# Patient Record
Sex: FEMALE | Race: WHITE | NOT HISPANIC OR LATINO | Employment: FULL TIME
[De-identification: names, ages, dates, MRNs, and addresses within clinical notes are randomized per-mention and may not be internally consistent; named-entity substitution may affect disease eponyms.]

---

## 2017-10-22 ENCOUNTER — HEALTH MAINTENANCE LETTER (OUTPATIENT)
Age: 32
End: 2017-10-22

## 2018-04-04 LAB
HPV SOURCE: NORMAL
HUMAN PAPILLOMA VIRUS 16 DNA: NEGATIVE
HUMAN PAPILLOMA VIRUS 18 DNA: NEGATIVE
HUMAN PAPILLOMA VIRUS FINAL DIAGNOSIS: NORMAL
HUMAN PAPILLOMA VIRUS OTHER HR: NEGATIVE
SPECIMEN DESCRIPTION: NORMAL

## 2018-04-10 ENCOUNTER — RECORDS - HEALTHEAST (OUTPATIENT)
Dept: ADMINISTRATIVE | Facility: OTHER | Age: 33
End: 2018-04-10

## 2018-04-10 LAB
BKR LAB AP ABNORMAL BLEEDING: NO
BKR LAB AP BIRTH CONTROL/HORMONES: ABNORMAL
BKR LAB AP CERVICAL APPEARANCE: NORMAL
BKR LAB AP GYN ADEQUACY: ABNORMAL
BKR LAB AP GYN INTERPRETATION: ABNORMAL
BKR LAB AP HPV REFLEX: ABNORMAL
BKR LAB AP LMP: ABNORMAL
BKR LAB AP PATIENT STATUS: ABNORMAL
BKR LAB AP PREVIOUS ABNORMAL: ABNORMAL
BKR LAB AP PREVIOUS NORMAL: ABNORMAL
HIGH RISK?: NO
PATH REPORT.COMMENTS IMP SPEC: ABNORMAL
RESULT FLAG (HE HISTORICAL CONVERSION): ABNORMAL

## 2018-04-13 ENCOUNTER — RECORDS - HEALTHEAST (OUTPATIENT)
Dept: LAB | Facility: CLINIC | Age: 33
End: 2018-04-13

## 2018-04-13 LAB — PROGEST SERPL-MCNC: 12.1 NG/ML

## 2018-04-27 ENCOUNTER — RECORDS - HEALTHEAST (OUTPATIENT)
Dept: LAB | Facility: CLINIC | Age: 33
End: 2018-04-27

## 2018-04-27 LAB
FSH SERPL-ACNC: 6.6 MIU/ML
LH SERPL-ACNC: 5.2 MIU/ML
PROLACTIN SERPL-MCNC: 6.3 NG/ML (ref 0–20)
TSH SERPL DL<=0.005 MIU/L-ACNC: 1.16 UIU/ML (ref 0.3–5)

## 2018-05-10 ENCOUNTER — RECORDS - HEALTHEAST (OUTPATIENT)
Dept: ADMINISTRATIVE | Facility: OTHER | Age: 33
End: 2018-05-10

## 2018-05-10 LAB
LAB AP CHARGES (HE HISTORICAL CONVERSION): NORMAL
PATH REPORT.COMMENTS IMP SPEC: NORMAL
PATH REPORT.COMMENTS IMP SPEC: NORMAL
PATH REPORT.FINAL DX SPEC: NORMAL
PATH REPORT.GROSS SPEC: NORMAL
PATH REPORT.MICROSCOPIC SPEC OTHER STN: NORMAL
PATH REPORT.RELEVANT HX SPEC: NORMAL
RESULT FLAG (HE HISTORICAL CONVERSION): NORMAL

## 2018-08-11 DIAGNOSIS — N92.5 RETROGRADE MENSTRUATION: Primary | ICD-10-CM

## 2018-10-05 ENCOUNTER — RECORDS - HEALTHEAST (OUTPATIENT)
Dept: LAB | Facility: CLINIC | Age: 33
End: 2018-10-05

## 2018-10-05 LAB
BASOPHILS # BLD AUTO: 0 THOU/UL (ref 0–0.2)
BASOPHILS NFR BLD AUTO: 0 % (ref 0–2)
EOSINOPHIL # BLD AUTO: 0.1 THOU/UL (ref 0–0.4)
EOSINOPHIL NFR BLD AUTO: 1 % (ref 0–6)
ERYTHROCYTE [DISTWIDTH] IN BLOOD BY AUTOMATED COUNT: 12 % (ref 11–14.5)
HCT VFR BLD AUTO: 37.7 % (ref 35–47)
HGB BLD-MCNC: 12.8 G/DL (ref 12–16)
HIV 1+2 AB+HIV1 P24 AG SERPL QL IA: NEGATIVE
LYMPHOCYTES # BLD AUTO: 1.6 THOU/UL (ref 0.8–4.4)
LYMPHOCYTES NFR BLD AUTO: 22 % (ref 20–40)
MCH RBC QN AUTO: 30.3 PG (ref 27–34)
MCHC RBC AUTO-ENTMCNC: 34 G/DL (ref 32–36)
MCV RBC AUTO: 89 FL (ref 80–100)
MONOCYTES # BLD AUTO: 0.5 THOU/UL (ref 0–0.9)
MONOCYTES NFR BLD AUTO: 7 % (ref 2–10)
NEUTROPHILS # BLD AUTO: 4.9 THOU/UL (ref 2–7.7)
NEUTROPHILS NFR BLD AUTO: 69 % (ref 50–70)
PLATELET # BLD AUTO: 209 THOU/UL (ref 140–440)
PMV BLD AUTO: 11.5 FL (ref 8.5–12.5)
RBC # BLD AUTO: 4.23 MILL/UL (ref 3.8–5.4)
WBC: 7.1 THOU/UL (ref 4–11)

## 2018-10-06 LAB
ANTIBODY SCREEN: NEGATIVE
BACTERIA SPEC CULT: NO GROWTH
HBV SURFACE AG SERPL QL IA: NEGATIVE
T PALLIDUM AB SER QL: NEGATIVE

## 2018-10-08 LAB
ABO/RH(D): NORMAL
ABORH REPEAT: NORMAL
C TRACH DNA SPEC QL PROBE+SIG AMP: NEGATIVE
HCV AB SERPL QL IA: NEGATIVE
N GONORRHOEA DNA SPEC QL NAA+PROBE: NEGATIVE
RUBV IGG SERPL QL IA: POSITIVE

## 2019-02-13 ENCOUNTER — RECORDS - HEALTHEAST (OUTPATIENT)
Dept: LAB | Facility: CLINIC | Age: 34
End: 2019-02-13

## 2019-02-14 LAB — T PALLIDUM AB SER QL: NEGATIVE

## 2019-05-08 ENCOUNTER — RECORDS - HEALTHEAST (OUTPATIENT)
Dept: LAB | Facility: CLINIC | Age: 34
End: 2019-05-08

## 2019-05-09 LAB — BACTERIA SPEC CULT: NO GROWTH

## 2019-06-09 LAB
BACTERIA SPEC CULT: ABNORMAL
BACTERIA SPEC CULT: ABNORMAL

## 2019-06-18 ENCOUNTER — RECORDS - HEALTHEAST (OUTPATIENT)
Dept: ADMINISTRATIVE | Facility: OTHER | Age: 34
End: 2019-06-18

## 2019-06-18 LAB
BKR LAB AP ABNORMAL BLEEDING: NO
BKR LAB AP BIRTH CONTROL/HORMONES: NORMAL
BKR LAB AP CERVICAL APPEARANCE: NORMAL
BKR LAB AP GYN ADEQUACY: NORMAL
BKR LAB AP GYN INTERPRETATION: NORMAL
BKR LAB AP HPV REFLEX: NORMAL
BKR LAB AP LMP: NORMAL
BKR LAB AP PATIENT STATUS: NORMAL
BKR LAB AP PREVIOUS ABNORMAL: NORMAL
BKR LAB AP PREVIOUS NORMAL: NORMAL
HIGH RISK?: YES
PATH REPORT.COMMENTS IMP SPEC: NORMAL
RESULT FLAG (HE HISTORICAL CONVERSION): NORMAL

## 2019-08-24 ENCOUNTER — RECORDS - HEALTHEAST (OUTPATIENT)
Dept: LAB | Facility: CLINIC | Age: 34
End: 2019-08-24

## 2019-08-24 LAB
ALBUMIN SERPL-MCNC: 4 G/DL (ref 3.5–5)
ANION GAP SERPL CALCULATED.3IONS-SCNC: 9 MMOL/L (ref 5–18)
BUN SERPL-MCNC: 16 MG/DL (ref 8–22)
CALCIUM SERPL-MCNC: 9.6 MG/DL (ref 8.5–10.5)
CHLORIDE BLD-SCNC: 105 MMOL/L (ref 98–107)
CO2 SERPL-SCNC: 25 MMOL/L (ref 22–31)
CREAT SERPL-MCNC: 0.84 MG/DL (ref 0.6–1.1)
GFR SERPL CREATININE-BSD FRML MDRD: >60 ML/MIN/1.73M2
GLUCOSE BLD-MCNC: 96 MG/DL (ref 70–125)
PHOSPHATE SERPL-MCNC: 3.7 MG/DL (ref 2.5–4.5)
POTASSIUM BLD-SCNC: 4.5 MMOL/L (ref 3.5–5)
SODIUM SERPL-SCNC: 139 MMOL/L (ref 136–145)

## 2019-08-26 LAB — BACTERIA SPEC CULT: NO GROWTH

## 2019-12-18 ENCOUNTER — RECORDS - HEALTHEAST (OUTPATIENT)
Dept: LAB | Facility: CLINIC | Age: 34
End: 2019-12-18

## 2019-12-20 LAB — BACTERIA SPEC CULT: NORMAL

## 2021-08-19 ENCOUNTER — LAB REQUISITION (OUTPATIENT)
Dept: LAB | Facility: CLINIC | Age: 36
End: 2021-08-19

## 2021-08-19 DIAGNOSIS — Z01.419 ENCOUNTER FOR GYNECOLOGICAL EXAMINATION (GENERAL) (ROUTINE) WITHOUT ABNORMAL FINDINGS: ICD-10-CM

## 2021-08-19 DIAGNOSIS — Z13.220 ENCOUNTER FOR SCREENING FOR LIPOID DISORDERS: ICD-10-CM

## 2021-08-19 DIAGNOSIS — N92.0 EXCESSIVE AND FREQUENT MENSTRUATION WITH REGULAR CYCLE: ICD-10-CM

## 2021-08-19 LAB
ANION GAP SERPL CALCULATED.3IONS-SCNC: 10 MMOL/L (ref 5–18)
BUN SERPL-MCNC: 10 MG/DL (ref 8–22)
CALCIUM SERPL-MCNC: 9.6 MG/DL (ref 8.5–10.5)
CHLORIDE BLD-SCNC: 105 MMOL/L (ref 98–107)
CHOLEST SERPL-MCNC: 172 MG/DL
CO2 SERPL-SCNC: 26 MMOL/L (ref 22–31)
CREAT SERPL-MCNC: 0.76 MG/DL (ref 0.6–1.1)
GFR SERPL CREATININE-BSD FRML MDRD: >90 ML/MIN/1.73M2
GLUCOSE BLD-MCNC: 91 MG/DL (ref 70–125)
HDLC SERPL-MCNC: 73 MG/DL
LDLC SERPL CALC-MCNC: 88 MG/DL
POTASSIUM BLD-SCNC: 4.3 MMOL/L (ref 3.5–5)
SODIUM SERPL-SCNC: 141 MMOL/L (ref 136–145)
TRIGL SERPL-MCNC: 53 MG/DL
TSH SERPL DL<=0.005 MIU/L-ACNC: 1.45 UIU/ML (ref 0.3–5)

## 2021-08-19 PROCEDURE — G0123 SCREEN CERV/VAG THIN LAYER: HCPCS | Performed by: STUDENT IN AN ORGANIZED HEALTH CARE EDUCATION/TRAINING PROGRAM

## 2021-08-19 PROCEDURE — 87624 HPV HI-RISK TYP POOLED RSLT: CPT | Performed by: STUDENT IN AN ORGANIZED HEALTH CARE EDUCATION/TRAINING PROGRAM

## 2021-08-19 PROCEDURE — 80061 LIPID PANEL: CPT | Performed by: STUDENT IN AN ORGANIZED HEALTH CARE EDUCATION/TRAINING PROGRAM

## 2021-08-19 PROCEDURE — 84443 ASSAY THYROID STIM HORMONE: CPT | Performed by: STUDENT IN AN ORGANIZED HEALTH CARE EDUCATION/TRAINING PROGRAM

## 2021-08-19 PROCEDURE — 80048 BASIC METABOLIC PNL TOTAL CA: CPT | Performed by: STUDENT IN AN ORGANIZED HEALTH CARE EDUCATION/TRAINING PROGRAM

## 2021-08-23 LAB
HUMAN PAPILLOMA VIRUS 16 DNA: NEGATIVE
HUMAN PAPILLOMA VIRUS 18 DNA: NEGATIVE
HUMAN PAPILLOMA VIRUS FINAL DIAGNOSIS: NORMAL
HUMAN PAPILLOMA VIRUS OTHER HR: NEGATIVE

## 2021-08-27 LAB
BKR LAB AP GYN ADEQUACY: NORMAL
BKR LAB AP GYN INTERPRETATION: NORMAL
BKR LAB AP HPV REFLEX: NORMAL
BKR LAB AP LMP: NORMAL
BKR LAB AP PREVIOUS ABNL DX: NORMAL
BKR LAB AP PREVIOUS ABNORMAL: NORMAL
PATH REPORT.COMMENTS IMP SPEC: NORMAL
PATH REPORT.RELEVANT HX SPEC: NORMAL

## 2022-11-07 ENCOUNTER — LAB REQUISITION (OUTPATIENT)
Dept: LAB | Facility: CLINIC | Age: 37
End: 2022-11-07

## 2022-11-07 DIAGNOSIS — N92.6 IRREGULAR MENSTRUATION, UNSPECIFIED: ICD-10-CM

## 2022-11-07 LAB
ANION GAP SERPL CALCULATED.3IONS-SCNC: 12 MMOL/L (ref 7–15)
BUN SERPL-MCNC: 13.9 MG/DL (ref 6–20)
CALCIUM SERPL-MCNC: 9.6 MG/DL (ref 8.6–10)
CHLORIDE SERPL-SCNC: 104 MMOL/L (ref 98–107)
CREAT SERPL-MCNC: 0.84 MG/DL (ref 0.51–0.95)
DEPRECATED HCO3 PLAS-SCNC: 24 MMOL/L (ref 22–29)
GFR SERPL CREATININE-BSD FRML MDRD: >90 ML/MIN/1.73M2
GLUCOSE SERPL-MCNC: 96 MG/DL (ref 70–99)
POTASSIUM SERPL-SCNC: 4.6 MMOL/L (ref 3.4–5.3)
SODIUM SERPL-SCNC: 140 MMOL/L (ref 136–145)
TSH SERPL DL<=0.005 MIU/L-ACNC: 1.12 UIU/ML (ref 0.3–4.2)

## 2022-11-07 PROCEDURE — 80048 BASIC METABOLIC PNL TOTAL CA: CPT | Performed by: PHYSICIAN ASSISTANT

## 2022-11-07 PROCEDURE — 84146 ASSAY OF PROLACTIN: CPT | Performed by: PHYSICIAN ASSISTANT

## 2022-11-07 PROCEDURE — 84443 ASSAY THYROID STIM HORMONE: CPT | Performed by: PHYSICIAN ASSISTANT

## 2022-11-08 LAB — PROLACTIN SERPL 3RD IS-MCNC: 6 NG/ML (ref 5–23)

## 2024-02-10 ENCOUNTER — HOSPITAL ENCOUNTER (EMERGENCY)
Facility: CLINIC | Age: 39
Discharge: HOME | End: 2024-02-10
Attending: EMERGENCY MEDICINE | Admitting: EMERGENCY MEDICINE
Payer: COMMERCIAL

## 2024-02-10 ENCOUNTER — APPOINTMENT (OUTPATIENT)
Dept: ULTRASOUND IMAGING | Facility: CLINIC | Age: 39
End: 2024-02-10
Payer: COMMERCIAL

## 2024-02-10 VITALS
BODY MASS INDEX: 27.6 KG/M2 | RESPIRATION RATE: 20 BRPM | SYSTOLIC BLOOD PRESSURE: 121 MMHG | DIASTOLIC BLOOD PRESSURE: 59 MMHG | OXYGEN SATURATION: 99 % | HEART RATE: 67 BPM | WEIGHT: 150 LBS | TEMPERATURE: 98 F | HEIGHT: 62 IN

## 2024-02-10 DIAGNOSIS — Z32.01 PREGNANCY TEST POSITIVE: ICD-10-CM

## 2024-02-10 DIAGNOSIS — R07.89 ATYPICAL CHEST PAIN: ICD-10-CM

## 2024-02-10 DIAGNOSIS — R06.00 DYSPNEA, UNSPECIFIED TYPE: ICD-10-CM

## 2024-02-10 LAB
ALBUMIN UR-MCNC: NEGATIVE MG/DL
ANION GAP SERPL CALCULATED.3IONS-SCNC: 13 MMOL/L (ref 7–15)
APPEARANCE UR: CLEAR
BACTERIA #/AREA URNS HPF: ABNORMAL /HPF
BASOPHILS # BLD AUTO: 0.1 10E3/UL (ref 0–0.2)
BASOPHILS NFR BLD AUTO: 1 %
BILIRUB UR QL STRIP: NEGATIVE
BUN SERPL-MCNC: 9.7 MG/DL (ref 6–20)
CALCIUM SERPL-MCNC: 9.8 MG/DL (ref 8.6–10)
CHLORIDE SERPL-SCNC: 105 MMOL/L (ref 98–107)
COLOR UR AUTO: COLORLESS
CREAT SERPL-MCNC: 0.83 MG/DL (ref 0.51–0.95)
D DIMER PPP FEU-MCNC: 0.7 UG/ML FEU (ref 0–0.5)
DEPRECATED HCO3 PLAS-SCNC: 22 MMOL/L (ref 22–29)
EGFRCR SERPLBLD CKD-EPI 2021: >90 ML/MIN/1.73M2
EOSINOPHIL # BLD AUTO: 0.1 10E3/UL (ref 0–0.7)
EOSINOPHIL NFR BLD AUTO: 1 %
ERYTHROCYTE [DISTWIDTH] IN BLOOD BY AUTOMATED COUNT: 12.6 % (ref 10–15)
FLUAV RNA SPEC QL NAA+PROBE: NEGATIVE
FLUBV RNA RESP QL NAA+PROBE: NEGATIVE
GLUCOSE SERPL-MCNC: 110 MG/DL (ref 70–99)
GLUCOSE UR STRIP-MCNC: NEGATIVE MG/DL
HCG UR QL: POSITIVE
HCT VFR BLD AUTO: 38.7 % (ref 35–47)
HGB BLD-MCNC: 13.2 G/DL (ref 11.7–15.7)
HGB UR QL STRIP: NEGATIVE
IMM GRANULOCYTES # BLD: 0 10E3/UL
IMM GRANULOCYTES NFR BLD: 0 %
KETONES UR STRIP-MCNC: NEGATIVE MG/DL
LEUKOCYTE ESTERASE UR QL STRIP: NEGATIVE
LYMPHOCYTES # BLD AUTO: 1.9 10E3/UL (ref 0.8–5.3)
LYMPHOCYTES NFR BLD AUTO: 21 %
MAGNESIUM SERPL-MCNC: 2.2 MG/DL (ref 1.7–2.3)
MCH RBC QN AUTO: 29 PG (ref 26.5–33)
MCHC RBC AUTO-ENTMCNC: 34.1 G/DL (ref 31.5–36.5)
MCV RBC AUTO: 85 FL (ref 78–100)
MONOCYTES # BLD AUTO: 0.7 10E3/UL (ref 0–1.3)
MONOCYTES NFR BLD AUTO: 8 %
MUCOUS THREADS #/AREA URNS LPF: PRESENT /LPF
NEUTROPHILS # BLD AUTO: 6.3 10E3/UL (ref 1.6–8.3)
NEUTROPHILS NFR BLD AUTO: 69 %
NITRATE UR QL: NEGATIVE
NRBC # BLD AUTO: 0 10E3/UL
NRBC BLD AUTO-RTO: 0 /100
PH UR STRIP: 5.5 [PH] (ref 5–7)
PLATELET # BLD AUTO: 252 10E3/UL (ref 150–450)
POTASSIUM SERPL-SCNC: 4.6 MMOL/L (ref 3.4–5.3)
RBC # BLD AUTO: 4.55 10E6/UL (ref 3.8–5.2)
RBC URINE: 0 /HPF
RSV RNA SPEC NAA+PROBE: NEGATIVE
SARS-COV-2 RNA RESP QL NAA+PROBE: NEGATIVE
SODIUM SERPL-SCNC: 140 MMOL/L (ref 135–145)
SP GR UR STRIP: 1.01 (ref 1–1.03)
SQUAMOUS EPITHELIAL: <1 /HPF
UROBILINOGEN UR STRIP-MCNC: <2 MG/DL
WBC # BLD AUTO: 9 10E3/UL (ref 4–11)
WBC URINE: <1 /HPF

## 2024-02-10 PROCEDURE — 81001 URINALYSIS AUTO W/SCOPE: CPT

## 2024-02-10 PROCEDURE — 83735 ASSAY OF MAGNESIUM: CPT

## 2024-02-10 PROCEDURE — 99285 EMERGENCY DEPT VISIT HI MDM: CPT | Mod: 25

## 2024-02-10 PROCEDURE — 93971 EXTREMITY STUDY: CPT | Mod: RT

## 2024-02-10 PROCEDURE — 85379 FIBRIN DEGRADATION QUANT: CPT

## 2024-02-10 PROCEDURE — 81025 URINE PREGNANCY TEST: CPT

## 2024-02-10 PROCEDURE — 80048 BASIC METABOLIC PNL TOTAL CA: CPT

## 2024-02-10 PROCEDURE — 36415 COLL VENOUS BLD VENIPUNCTURE: CPT

## 2024-02-10 PROCEDURE — 93005 ELECTROCARDIOGRAM TRACING: CPT | Performed by: EMERGENCY MEDICINE

## 2024-02-10 PROCEDURE — 85025 COMPLETE CBC W/AUTO DIFF WBC: CPT

## 2024-02-10 PROCEDURE — 87637 SARSCOV2&INF A&B&RSV AMP PRB: CPT

## 2024-02-10 PROCEDURE — 87086 URINE CULTURE/COLONY COUNT: CPT

## 2024-02-10 ASSESSMENT — ACTIVITIES OF DAILY LIVING (ADL): ADLS_ACUITY_SCORE: 35

## 2024-02-10 NOTE — ED PROVIDER NOTES
"Emergency Department Midlevel Supervisory Note     I had a face to face encounter with this patient seen by the Advanced Practice Provider (H[facility]ILTON). I personally made/approved the management plan and take responsibility for the patient management. I personally saw patient and performed a substantive portion of the visit including all aspects of the medical decision making.     ED Course:  4:53 PM Barbara Banks PA-C staffed patient with me. I agree with their assessment and plan of management, and I will see the patient.  5:30 PM I met with the patient to introduce myself, gather additional history, perform my initial exam, and discuss the plan.  Patient informed of positive pregnancy test and inability to perform CTA.  Will proceed with ultrasound of the  6:19 PM.  Ultrasound negative for acute DVT.  Will continue outpatient management.  Patient with atypical chest pain without evidence of infectious process.  Now feeling improved.  Likelihood of PE extremely remote.  Given patient's pregnancy CT imaging not performed.  Routine return precautions given  Brief HPI:     Nicky Rios is a 38 year old female who presents for evaluation of sudden shortness of breath and chest pain.  Symptoms started while patient was landing after approximately 1-1/2-hour flight.  Also reports some cramping/achiness in her right calf.      Brief Physical Exam: BP (!) 170/97   Pulse 89   Temp 98  F (36.7  C) (Temporal)   Resp 18   Ht 1.575 m (5' 2\")   Wt 68 kg (150 lb)   SpO2 99%   BMI 27.44 kg/m    Constitutional:  Alert, in no acute distress  EYES: Conjunctivae clear  HENT:  Atraumatic  Respiratory:  Respirations even, unlabored, in no acute respiratory distress  Cardiovascular:  Regular rate and rhythm, good peripheral perfusion  GI: Soft, non-distended, non-tender  Musculoskeletal:  Moves all 4 extremities equally, grossly symmetrical strength  Integument: Warm & dry. No appreciable rash, erythema.  Neurologic:  Alert " & oriented, speech clear and fluent, no focal deficits noted  Psych: Normal mood and affect       MDM:    ED Course as of 02/10/24 1830   Sat Feb 10, 2024   1641 The patient is a pleasant 38-year-old female with a remote history of renal failure of unknown etiology presenting to the emergency department today for shortness of breath described as feeling as though she cannot take a deep enough breath, no pleuritic nature of her pain onset as the plane was landing prior to arrival.  No fevers or chest pain with this.  Her symptoms are unchanged with exertion.    She developed right calf tightness and slight discomfort 2 days ago.  No preceding trauma or injury.  She is not on birth control, no personal history of PE or DVT, no recent long car rides or plane rides more than 3 hours in duration.  No recent prolonged periods of immobilization.    She also endorses urinary urgency for the past several days.  She also has had ongoing bilateral low back pain for which she sees a chiropractor for, has been slightly worse in the past week.  No incontinence of bowel or bladder, no saddle paresthesias, no numbness, tingling, weakness.  No preceding trauma or injuries.  No IV drug use.   1641 Initial vital signs reviewed and significant for elevated blood pressure.  On exam, patient is clinically well-appearing and is nontoxic-appearing resting comfortably in hospital bed in no acute distress.  She appears to be breathing easily comfortably on room air.  No increased work of breathing.  SpO2 99% on room air.  Lung sounds are clear to auscultation throughout, no wheezing, rales, or rhonchi.  Abdomen soft, nontender.  No midline cervical, thoracic, lumbar tenderness to palpation, no crepitus, step-offs, deformities.  No reproducible tenderness to palpation throughout low back musculature.  No CVA tenderness.  Her right calf does appear slightly larger than the left.  She does have some mild tenderness with palpation posterior  calf.  No tenderness with palpation of posterior fossa.   1643 Differential diagnosis for patient's shortness of breath includes viral upper or lower respiratory infection, pneumonia, pulmonary edema/CHF, PE, pericarditis, reactive airway disease, anemia. No trauma or known blebs to suggest pneumothorax. No CP or history to suggest atypical presentation of MI.     Differential diagnosis for her calf pain includes DVT, musculoskeletal sprain/strain.  Low clinical suspicion for compartment syndrome, necrotizing fasciitis, cellulitis/erysipelas, fractures, dislocations, septic joint.    Differential diagnosis for her back pain includes cystitis, pyelonephritis, musculoskeletal sprain/strain.  Low clinical suspicion for slipped disc w/ radicular pain including sciatica, slipped disc w/ cauda equina syndrome, vertebral fracture, vertebral tumor, epidural abscess / discitis. I have low clinical suspicion for fracture to be the source of this patient's pain as there was no preceding trauma.  Therefore and absence of red flag symptoms, imaging of the spine was not done.  Low clinical suspicion for ruptured AAA.  There is no palpable, pulsatile mass on exam and patient does not have any ABD pain.     1656 ECG with ventricular rate 81 bpm.  Rhythm is sinus rhythm with sinus arrhythmia.  OR interval 146.  QTc 439.  Low clinical suspicion for pericarditis, significant arrhythmia.   1717 CBC with platelets differential  No leukocytosis.  Hemoglobin within normal limits at 13.2.   1717 Basic metabolic panel(!)  No significant electrolyte abnormality.  Elevated nonfasting glucose.   1717 Magnesium   1717 UA with Microscopic reflex to Culture(!)  Not suggestive of infection.  Cystitis and pyelonephritis less likely.   1718 Creatinine: 0.83   1736 D-Dimer Quantitative(!): 0.70  Plan for CT PE run and ultrasound of the right calf.   1736 Symptomatic Influenza A/B, RSV, & SARS-CoV2 PCR (COVID-19) Nose   1736 HCG Qual Urine(!):  Positive  The patient reports that her shortness of breath is currently resolved.  Given this, low clinical suspicion for pulmonary embolus as she is clinically well-appearing with no tachypnea, patient is saturating % on room air, tachycardia, no pleuritic chest pain.  Especially as patient has positive urine pregnancy test, I do not feel that CT PE run is indicated at this time with low clinical suspicion for pulmonary embolism.  Of note, patient's last menstrual period was January 4th, 2024.  She has irregular menstrual periods.   1823 US Lower Extremity Venous Duplex Right  No deep venous thrombosis in the right lower extremity.   1828 I updated the patient on her results.  Discussed plan for discharge to home in stable condition with close outpatient follow-up with her primary care clinician as well as her OB/GYN.  The patient expressed understanding and is comfortable with this plan.  Strict return precautions discussed.       Dyspnea  Chest pain resolved    EKG:   Normal sinus rhythm with sinus arrhythmia.  Rate of 81.  Normal QRS.  Normal ST segments are normal EKG.  No prior tracing    I reviewed and independently interpreted the patient's EKG, with comments made as listed below. Please see scanned EKG for full report.       Procedures:  I was present for the key portions of procedures documented in HAMILTON/midlevel note, see midlevel note for further details.    Juanito Renner MD  [facility] EMERGENCY ROOM  [address] [address] [phone]     Juanito Renner MD  02/10/24 9205

## 2024-02-10 NOTE — ED PROVIDER NOTES
EMERGENCY DEPARTMENT ENCOUNTER      NAME: Nicky Rios  AGE: 38 year old female  YOB: 1985  MRN: [id number]  EVALUATION DATE & TIME: 02/10/24 4:11 PM    PCP: Nelli Williamson    ED PROVIDER: Barbara Banks PA-C      CHIEF COMPLAINT:  Shortness of Breath and Chest Pain      FINAL IMPRESSION:  1. Pregnancy test positive    2. Atypical chest pain    3. Dyspnea, unspecified type          ED COURSE & MEDICAL DECISION MAKING:  Pertinent Labs & Imaging studies reviewed. (See chart for details)    ED Course as of 02/10/24 2356   Sat Feb 10, 2024   1641 The patient is a pleasant 38-year-old female with a remote history of renal failure of unknown etiology presenting to the emergency department today for shortness of breath described as feeling as though she cannot take a deep enough breath, no pleuritic nature of her pain onset as the plane was landing prior to arrival.  No fevers or chest pain with this.  Her symptoms are unchanged with exertion.    She developed right calf tightness and slight discomfort 2 days ago.  No preceding trauma or injury.  She is not on birth control, no personal history of PE or DVT, no recent long car rides or plane rides more than 3 hours in duration.  No recent prolonged periods of immobilization.    She also endorses urinary urgency for the past several days.  She also has had ongoing bilateral low back pain for which she sees a chiropractor for, has been slightly worse in the past week.  No incontinence of bowel or bladder, no saddle paresthesias, no numbness, tingling, weakness.  No preceding trauma or injuries.  No IV drug use.   1641 Initial vital signs reviewed and significant for elevated blood pressure.  On exam, patient is clinically well-appearing and is nontoxic-appearing resting comfortably in hospital bed in no acute distress.  She appears to be breathing easily comfortably on room air.  No increased work of breathing.  SpO2 99% on room air.   Lung sounds are clear to auscultation throughout, no wheezing, rales, or rhonchi.  Abdomen soft, nontender.  No midline cervical, thoracic, lumbar tenderness to palpation, no crepitus, step-offs, deformities.  No reproducible tenderness to palpation throughout low back musculature.  No CVA tenderness.  Her right calf does appear slightly larger than the left.  She does have some mild tenderness with palpation posterior calf.  No tenderness with palpation of posterior fossa.   1643 Differential diagnosis for patient's shortness of breath includes viral upper or lower respiratory infection, pneumonia, pulmonary edema/CHF, PE, pericarditis, reactive airway disease, anemia. Low clinical suspicion for ACS. No trauma or known blebs to suggest pneumothorax.     Differential diagnosis for her calf pain includes DVT, musculoskeletal sprain/strain.  Low clinical suspicion for compartment syndrome, necrotizing fasciitis, cellulitis/erysipelas, fractures, dislocations, septic joint.    Differential diagnosis for her back pain includes cystitis, pyelonephritis, musculoskeletal sprain/strain.  Low clinical suspicion for slipped disc w/ radicular pain including sciatica, slipped disc w/ cauda equina syndrome, vertebral fracture, vertebral tumor, epidural abscess / discitis. I have low clinical suspicion for fracture to be the source of this patient's pain as there was no preceding trauma.  Therefore and absence of red flag symptoms, imaging of the spine was not done.  Low clinical suspicion for ruptured AAA.  There is no palpable, pulsatile mass on exam and patient does not have any ABD pain.     1656 ECG with ventricular rate 81 bpm.  Rhythm is sinus rhythm with sinus arrhythmia.  CT interval 146.  QTc 439.  Low clinical suspicion for pericarditis, significant arrhythmia.   1717 CBC with platelets differential  No leukocytosis.  Hemoglobin within normal limits at 13.2.   1717 Basic metabolic panel(!)  No significant electrolyte  abnormality.  Elevated nonfasting glucose.   1717 Magnesium   1717 UA with Microscopic reflex to Culture(!)  Not suggestive of infection.  Cystitis and pyelonephritis less likely.   1718 Creatinine: 0.83   1736 D-Dimer Quantitative(!): 0.70  Plan for CT PE run and ultrasound of the right calf.   1736 Symptomatic Influenza A/B, RSV, & SARS-CoV2 PCR (COVID-19) Nose   1736 HCG Qual Urine(!): Positive  The patient reports that her shortness of breath is currently resolved.  Given this, low clinical suspicion for pulmonary embolus as she is clinically well-appearing with no tachypnea, patient is saturating % on room air, tachycardia, no pleuritic chest pain.  Especially as patient has positive pregnancy test, I do not feel that CT PE run is indicated at this time with low clinical suspicion for pulmonary embolism.  Of note, patient's last menstrual period was January 4th, 2024.  She has irregular menstrual periods.   1823 US Lower Extremity Venous Duplex Right  No deep venous thrombosis in the right lower extremity.   1828 I updated the patient on her results.  Overall, patient history and workup here most consistent with atypical chest pain, dyspnea, and positive pregnancy test. Discussed plan for discharge to home in stable condition with close outpatient follow-up with her primary care clinician as well as her OB/GYN.  The patient expressed understanding and is comfortable with this plan.  Strict return precautions discussed.       At the conclusion of the encounter I discussed the results of all of the tests and the disposition. The questions were answered. The patient or family acknowledged understanding and was agreeable with the care plan.       ED COURSE:  4:20 PM I met and introduced myself to the patient. I gathered initial history and performed an initial physical exam. We discussed options and plan for diagnostics and treatment here in the ED.  4:57 PM I have staffed the patient with Dr. Renner, ED  physician, who has evaluated the patient and agrees with all aspects of today's care.   5:25 PM I rechecked and updated the patient on test results.   5:30 PM I returned to the patient's room with Dr. Renner for further discussion.  6:29 PM I rechecked and updated the patient on additional test results. We discussed the plan for discharge and the patient is agreeable. We reviewed supportive cares, symptomatic treatment, outpatient follow up, and reasons to return to the Emergency Department.           Medical Decision Making  Obtained supplemental history:Supplemental history obtained?: No  Reviewed external records: External records reviewed?: Outpatient Record  Care impacted by chronic illness:N/A  Care significantly affected by social determinants of health:N/A  Did you consider but not order tests?: Work up considered but not performed and documented in chart, if applicable  Did you interpret images independently?: Independent interpretation of ECG and images noted in documentation, when applicable.  Consultation discussion with other provider:Did you involve another provider (consultant, MH, pharmacy, etc.)?: I discussed the care with another health care provider, see documentation for details.  Discharge. No recommendations on prescription strength medication(s). See documentation for any additional details.           MEDICATIONS GIVEN IN THE EMERGENCY:  Medications - No data to display    NEW PRESCRIPTIONS STARTED AT TODAY'S ER VISIT  Discharge Medication List as of 2/10/2024  6:41 PM             =================================================================    HPI    Patient information was obtained from: patient    Use of Intrepreter: N/A        Nicky Rios is a 38 year old female with pertinent medical history of renal failure and ureteral stone who presents to the emergency department for evaluation of calf swelling and shortness of breath.    Per chart review, patient was seen at [facility] on 12/15/2021 for evaluation of a one day history of chest pressure and pain. Vitally stable and afebrile on presentation. Labs were largely unremarkable at that time, including negative Troponin. CXR negative. EKG was in sinus rhythm with low QRS voltage in chest leads. Patient was discharged in stable condition with prescription for five day course of Prednisone 40 mg daily.     Patient reports a two day history of right calf tightness and swelling that began while traveling via airplane. While flying home this afternoon she had sudden onset of chest tightness and dyspnea, prompting her ED presentation for evaluation. Her symptoms are not exacerbated with exertion and she notes symptomatic improvement since ED arrival. She denies any recent air or car travel >4 hours. She does not take birth control and has had no recent surgeries. Denies history of blood clots or IV drug use.     Additionally, patient endorses a one month history of bilateral lower back pain that has worsened in the last week along with a two day history of urinary frequency. She denies any recent trauma or falls to account for her back pain. Patient otherwise denies fever, cough, congestion, diarrhea, constipation, vaginal discharge, or any other symptoms or concerns at this time.        PAST MEDICAL HISTORY:  Past Medical History:   Diagnosis Date    Kidney failure      2/2 food born illness-resolved- 2010       PAST SURGICAL HISTORY:  Past Surgical History:   Procedure Laterality Date    NO HISTORY OF SURGERY         CURRENT MEDICATIONS:    Prior to Admission Medications   Prescriptions Last Dose Informant Patient Reported? Taking?   Calcium Carbonate-Vit D-Min (CALCIUM 1200 PO)   Yes No   Sig: Take  by mouth.   Multiple Vitamin (DAILY MULTIVITAMIN PO)   Yes No   Sig: Take  by mouth.   Omega-3 Fatty Acids (FISH OIL BURP-LESS PO)   Yes No   Sig: Take  by mouth.   norethindrone-ethinyl estradiol (JUNEL FE 1/20) 1-20 MG-MCG per  "tablet   No No   Sig: Take 1 tablet by mouth daily.   norethindrone-ethinyl estradiol (MICROGESTIN FE 1/20) 1-20 MG-MCG per tablet   Yes No   Sig: Take 1 tablet by mouth daily.      Facility-Administered Medications: None       ALLERGIES:  No Known Allergies    FAMILY HISTORY:  Family History   Problem Relation Age of Onset    Cancer Maternal Grandmother 50        ovarian ca       SOCIAL HISTORY:  Social History     Tobacco Use    Smoking status: Never    Smokeless tobacco: Never   Substance Use Topics    Alcohol use: Yes     Comment: One drink weekly    Drug use: No        VITALS:    First Vitals:  Patient Vitals for the past 24 hrs:   BP Temp Temp src Pulse Resp SpO2 Height Weight   02/10/24 1715 121/59 -- -- 67 20 99 % -- --   02/10/24 1700 129/73 -- -- 70 18 100 % -- --   02/10/24 1607 (!) 156/85 -- -- 75 16 100 % -- --   02/10/24 1554 (!) 170/97 98  F (36.7  C) Temporal 89 18 99 % 1.575 m (5' 2\") 68 kg (150 lb)       Patient Vitals for the past 24 hrs:   BP Temp Temp src Pulse Resp SpO2 Height Weight   02/10/24 1715 121/59 -- -- 67 20 99 % -- --   02/10/24 1700 129/73 -- -- 70 18 100 % -- --   02/10/24 1607 (!) 156/85 -- -- 75 16 100 % -- --   02/10/24 1554 (!) 170/97 98  F (36.7  C) Temporal 89 18 99 % 1.575 m (5' 2\") 68 kg (150 lb)         PHYSICAL EXAM  VITAL SIGNS: /59   Pulse 67   Temp 98  F (36.7  C) (Temporal)   Resp 20   Ht 1.575 m (5' 2\")   Wt 68 kg (150 lb)   SpO2 99%   BMI 27.44 kg/m     Constitutional: Awake, alert, No acute distress.   HENT: Normocephalic, atraumatic. Full ROM. No c-spine tenderness, crepitus, step-offs, or deformities.  No nuchal rigidity.  Eyes: PERRL, EOMI, Conjunctiva normal, No discharge, no scleral icterus.   Respiratory: No respiratory distress, Breathing comfortably on room air. Lungs clear to auscultation bilaterally. Breath sounds are equal.   Cardiovascular: Regular rate and rhythm. Peripheral pulses pt and radial are wnl. Right calf slightly tender and " slightly more edematous than the left.   GI: Bowel sounds normal, Soft, No tenderness, No flank tenderness, nondistended.  : No CVA tenderness.     Musculoskeletal: No tenderness along thoracic or lumbar spine. No crepitus, step-offs, or deformities. No tenderness with palpation throughout bilateral low back musculature.  strength 5/5 and symmetric. No leg length asymmetry or external rotation. Negative SLR and strength in lower extremities intact and symmetric.   Integument: Warm, Dry. No skin changes, ecchymosis, abrasions, warmth, or redness.  Neurologic: Alert & oriented x 3. Normal speech. Distal sensation intact and symmetric.    Psychiatric: Affect normal, Judgment normal, Mood normal. No obvious hallucinations at this time.         RADIOLOGY/LAB:  Reviewed all pertinent imaging. Please see official radiology report.  All pertinent labs reviewed and interpreted.  Results for orders placed or performed during the hospital encounter of 02/10/24   US Lower Extremity Venous Duplex Right    Impression    IMPRESSION:  1.  No deep venous thrombosis in the right lower extremity.   Basic metabolic panel   Result Value Ref Range    Sodium 140 135 - 145 mmol/L    Potassium 4.6 3.4 - 5.3 mmol/L    Chloride 105 98 - 107 mmol/L    Carbon Dioxide (CO2) 22 22 - 29 mmol/L    Anion Gap 13 7 - 15 mmol/L    Urea Nitrogen 9.7 6.0 - 20.0 mg/dL    Creatinine 0.83 0.51 - 0.95 mg/dL    GFR Estimate >90 >60 mL/min/1.73m2    Calcium 9.8 8.6 - 10.0 mg/dL    Glucose 110 (H) 70 - 99 mg/dL   Result Value Ref Range    Magnesium 2.2 1.7 - 2.3 mg/dL   UA with Microscopic reflex to Culture    Specimen: Urine, Midstream   Result Value Ref Range    Color Urine Colorless Colorless, Straw, Light Yellow, Yellow    Appearance Urine Clear Clear    Glucose Urine Negative Negative mg/dL    Bilirubin Urine Negative Negative    Ketones Urine Negative Negative mg/dL    Specific Gravity Urine 1.008 1.001 - 1.030    Blood Urine Negative Negative    pH  Urine 5.5 5.0 - 7.0    Protein Albumin Urine Negative Negative mg/dL    Urobilinogen Urine <2.0 <2.0 mg/dL    Nitrite Urine Negative Negative    Leukocyte Esterase Urine Negative Negative    Bacteria Urine Few (A) None Seen /HPF    Mucus Urine Present (A) None Seen /LPF    RBC Urine 0 <=2 /HPF    WBC Urine <1 <=5 /HPF    Squamous Epithelials Urine <1 <=1 /HPF   CBC with platelets and differential   Result Value Ref Range    WBC Count 9.0 4.0 - 11.0 10e3/uL    RBC Count 4.55 3.80 - 5.20 10e6/uL    Hemoglobin 13.2 11.7 - 15.7 g/dL    Hematocrit 38.7 35.0 - 47.0 %    MCV 85 78 - 100 fL    MCH 29.0 26.5 - 33.0 pg    MCHC 34.1 31.5 - 36.5 g/dL    RDW 12.6 10.0 - 15.0 %    Platelet Count 252 150 - 450 10e3/uL    % Neutrophils 69 %    % Lymphocytes 21 %    % Monocytes 8 %    % Eosinophils 1 %    % Basophils 1 %    % Immature Granulocytes 0 %    NRBCs per 100 WBC 0 <1 /100    Absolute Neutrophils 6.3 1.6 - 8.3 10e3/uL    Absolute Lymphocytes 1.9 0.8 - 5.3 10e3/uL    Absolute Monocytes 0.7 0.0 - 1.3 10e3/uL    Absolute Eosinophils 0.1 0.0 - 0.7 10e3/uL    Absolute Basophils 0.1 0.0 - 0.2 10e3/uL    Absolute Immature Granulocytes 0.0 <=0.4 10e3/uL    Absolute NRBCs 0.0 10e3/uL   Symptomatic Influenza A/B, RSV, & SARS-CoV2 PCR (COVID-19) Nose    Specimen: Nose; Swab   Result Value Ref Range    Influenza A PCR Negative Negative    Influenza B PCR Negative Negative    RSV PCR Negative Negative    SARS CoV2 PCR Negative Negative   D dimer quantitative   Result Value Ref Range    D-Dimer Quantitative 0.70 (H) 0.00 - 0.50 ug/mL FEU   HCG qualitative urine   Result Value Ref Range    hCG Urine Qualitative Positive (A) Negative         EKG:  Performed at: 1558    Impression: Normal ECG.    Rate: 81  Rhythm: Normal sinus rhythm with sinus arrhythmia  Axis: 70  60  60  MS Interval: 146  QRS Interval: 76  QTc Interval: 439  ST Changes: No STEMI  Comparison: No prior    EKG results independently reviewed and interpreted by Dr. Solomon  Zurdo, ED physician.         I, Regina Reyes, am serving as a scribe to document services personally performed by Barbara Banks PA-C, based on my observation and the provider's statements to me. I, aBrbara Banks PA-C attest that Regina Reyes is acting in a scribe capacity, has observed my performance of the services and has documented them in accordance with my direction.         Barbara Banks PA-C  Emergency Medicine  [facility] EMERGENCY ROOM  [address] [address]  [phone]  Dept: [phone]     Barbara Banks PA-C  02/11/24 0011

## 2024-02-10 NOTE — ED TRIAGE NOTES
Pt presents to the ED with c/o sudden onset of SOB and CP around 1515 today. Pt was flying home from [place] when sx started. Since- PT feels like she is unable to catch breath. Pt also has increase in urine frequency.

## 2024-02-11 LAB
ATRIAL RATE - MUSE: 81 BPM
DIASTOLIC BLOOD PRESSURE - MUSE: NORMAL MMHG
INTERPRETATION ECG - MUSE: NORMAL
P AXIS - MUSE: 70 DEGREES
PR INTERVAL - MUSE: 146 MS
QRS DURATION - MUSE: 76 MS
QT - MUSE: 378 MS
QTC - MUSE: 439 MS
R AXIS - MUSE: 60 DEGREES
SYSTOLIC BLOOD PRESSURE - MUSE: NORMAL MMHG
T AXIS - MUSE: 60 DEGREES
VENTRICULAR RATE- MUSE: 81 BPM

## 2024-02-11 NOTE — ED NOTES
Discharge paperwork discussed with pt. Questions were answered to patient satisfaction. Nasima Burroughs RN 2/10/2024 6:46 PM

## 2024-02-11 NOTE — DISCHARGE INSTRUCTIONS
You were seen in the emergency department today for your symptoms.  The testing that we did here today was reassuring.  You had a positive pregnancy test today.  Please follow-up with your primary physician for close recheck as well as your OB/GYN physician.  Please return to the emergency department if you develop any worsening or concerning symptoms.

## 2024-02-12 LAB — BACTERIA UR CULT: NO GROWTH

## 2024-08-02 ENCOUNTER — LAB REQUISITION (OUTPATIENT)
Dept: LAB | Facility: CLINIC | Age: 39
End: 2024-08-02
Payer: COMMERCIAL

## 2024-08-02 DIAGNOSIS — Z13.6 ENCOUNTER FOR SCREENING FOR CARDIOVASCULAR DISORDERS: ICD-10-CM

## 2024-08-02 DIAGNOSIS — Z01.419 ENCOUNTER FOR GYNECOLOGICAL EXAMINATION (GENERAL) (ROUTINE) WITHOUT ABNORMAL FINDINGS: ICD-10-CM

## 2024-08-02 DIAGNOSIS — Z13.1 ENCOUNTER FOR SCREENING FOR DIABETES MELLITUS: ICD-10-CM

## 2024-08-02 LAB
ANION GAP SERPL CALCULATED.3IONS-SCNC: 12 MMOL/L (ref 7–15)
BUN SERPL-MCNC: 16.4 MG/DL (ref 6–20)
CALCIUM SERPL-MCNC: 9.5 MG/DL (ref 8.8–10.4)
CHLORIDE SERPL-SCNC: 103 MMOL/L (ref 98–107)
CHOLEST SERPL-MCNC: 159 MG/DL
CREAT SERPL-MCNC: 0.76 MG/DL (ref 0.51–0.95)
EGFRCR SERPLBLD CKD-EPI 2021: >90 ML/MIN/1.73M2
FASTING STATUS PATIENT QL REPORTED: NORMAL
FASTING STATUS PATIENT QL REPORTED: NORMAL
GLUCOSE SERPL-MCNC: 87 MG/DL (ref 70–99)
HCO3 SERPL-SCNC: 26 MMOL/L (ref 22–29)
HDLC SERPL-MCNC: 64 MG/DL
LDLC SERPL CALC-MCNC: 86 MG/DL
NONHDLC SERPL-MCNC: 95 MG/DL
POTASSIUM SERPL-SCNC: 3.6 MMOL/L (ref 3.4–5.3)
SODIUM SERPL-SCNC: 141 MMOL/L (ref 135–145)
TRIGL SERPL-MCNC: 47 MG/DL

## 2024-08-02 PROCEDURE — 87624 HPV HI-RISK TYP POOLED RSLT: CPT

## 2024-08-02 PROCEDURE — G0145 SCR C/V CYTO,THINLAYER,RESCR: HCPCS

## 2024-08-02 PROCEDURE — 80061 LIPID PANEL: CPT

## 2024-08-02 PROCEDURE — 80048 BASIC METABOLIC PNL TOTAL CA: CPT

## 2024-08-07 LAB
HPV HR 12 DNA CVX QL NAA+PROBE: NEGATIVE
HPV16 DNA CVX QL NAA+PROBE: NEGATIVE
HPV18 DNA CVX QL NAA+PROBE: NEGATIVE
HUMAN PAPILLOMA VIRUS FINAL DIAGNOSIS: NORMAL

## 2024-08-08 LAB
BKR LAB AP GYN ADEQUACY: NORMAL
BKR LAB AP GYN INTERPRETATION: NORMAL
BKR LAB AP LMP: NORMAL
BKR LAB AP PREVIOUS ABNL DX: NORMAL
BKR LAB AP PREVIOUS ABNORMAL: NORMAL
PATH REPORT.COMMENTS IMP SPEC: NORMAL
PATH REPORT.COMMENTS IMP SPEC: NORMAL
PATH REPORT.RELEVANT HX SPEC: NORMAL